# Patient Record
Sex: MALE | Race: WHITE | NOT HISPANIC OR LATINO | Employment: FULL TIME | URBAN - NONMETROPOLITAN AREA
[De-identification: names, ages, dates, MRNs, and addresses within clinical notes are randomized per-mention and may not be internally consistent; named-entity substitution may affect disease eponyms.]

---

## 2023-02-18 ENCOUNTER — APPOINTMENT (EMERGENCY)
Dept: CT IMAGING | Facility: HOSPITAL | Age: 28
End: 2023-02-18

## 2023-02-18 ENCOUNTER — HOSPITAL ENCOUNTER (EMERGENCY)
Facility: HOSPITAL | Age: 28
Discharge: HOME | End: 2023-02-18
Attending: EMERGENCY MEDICINE | Admitting: EMERGENCY MEDICINE

## 2023-02-18 ENCOUNTER — APPOINTMENT (EMERGENCY)
Dept: RADIOLOGY | Facility: HOSPITAL | Age: 28
End: 2023-02-18

## 2023-02-18 VITALS
TEMPERATURE: 97 F | SYSTOLIC BLOOD PRESSURE: 134 MMHG | RESPIRATION RATE: 18 BRPM | OXYGEN SATURATION: 97 % | HEART RATE: 74 BPM | DIASTOLIC BLOOD PRESSURE: 76 MMHG | WEIGHT: 256.39 LBS

## 2023-02-18 DIAGNOSIS — F43.9 STRESS: ICD-10-CM

## 2023-02-18 DIAGNOSIS — R56.9 SEIZURE-LIKE ACTIVITY (HCC): Primary | ICD-10-CM

## 2023-02-18 LAB
ALBUMIN SERPL BCP-MCNC: 4.1 G/DL (ref 3.5–5)
ALP SERPL-CCNC: 55 U/L (ref 34–104)
ALT SERPL W P-5'-P-CCNC: 41 U/L (ref 7–52)
AMPHETAMINES SERPL QL SCN: NEGATIVE
ANION GAP SERPL CALCULATED.3IONS-SCNC: 6 MMOL/L (ref 4–13)
APTT PPP: 27 SECONDS (ref 23–37)
AST SERPL W P-5'-P-CCNC: 25 U/L (ref 13–39)
BARBITURATES UR QL: NEGATIVE
BASOPHILS # BLD AUTO: 0.03 THOUSANDS/ÂΜL (ref 0–0.1)
BASOPHILS NFR BLD AUTO: 1 % (ref 0–1)
BENZODIAZ UR QL: NEGATIVE
BILIRUB SERPL-MCNC: 1.12 MG/DL (ref 0.2–1)
BUN SERPL-MCNC: 13 MG/DL (ref 5–25)
CALCIUM SERPL-MCNC: 9.3 MG/DL (ref 8.4–10.2)
CARDIAC TROPONIN I PNL SERPL HS: 3 NG/L
CHLORIDE SERPL-SCNC: 106 MMOL/L (ref 96–108)
CO2 SERPL-SCNC: 26 MMOL/L (ref 21–32)
COCAINE UR QL: NEGATIVE
CREAT SERPL-MCNC: 0.71 MG/DL (ref 0.6–1.3)
EOSINOPHIL # BLD AUTO: 0.13 THOUSAND/ÂΜL (ref 0–0.61)
EOSINOPHIL NFR BLD AUTO: 2 % (ref 0–6)
ERYTHROCYTE [DISTWIDTH] IN BLOOD BY AUTOMATED COUNT: 11.9 % (ref 11.6–15.1)
ETHANOL SERPL-MCNC: <10 MG/DL
GFR SERPL CREATININE-BSD FRML MDRD: 126 ML/MIN/1.73SQ M
GLUCOSE SERPL-MCNC: 81 MG/DL (ref 65–140)
GLUCOSE SERPL-MCNC: 93 MG/DL (ref 65–140)
HCT VFR BLD AUTO: 45.1 % (ref 36.5–49.3)
HGB BLD-MCNC: 15.9 G/DL (ref 12–17)
IMM GRANULOCYTES # BLD AUTO: 0.01 THOUSAND/UL (ref 0–0.2)
IMM GRANULOCYTES NFR BLD AUTO: 0 % (ref 0–2)
INR PPP: 1.03 (ref 0.84–1.19)
LACTATE SERPL-SCNC: 0.6 MMOL/L (ref 0.5–2)
LYMPHOCYTES # BLD AUTO: 2.04 THOUSANDS/ÂΜL (ref 0.6–4.47)
LYMPHOCYTES NFR BLD AUTO: 35 % (ref 14–44)
MCH RBC QN AUTO: 30.2 PG (ref 26.8–34.3)
MCHC RBC AUTO-ENTMCNC: 35.3 G/DL (ref 31.4–37.4)
MCV RBC AUTO: 86 FL (ref 82–98)
METHADONE UR QL: NEGATIVE
MONOCYTES # BLD AUTO: 0.62 THOUSAND/ÂΜL (ref 0.17–1.22)
MONOCYTES NFR BLD AUTO: 11 % (ref 4–12)
NEUTROPHILS # BLD AUTO: 3.08 THOUSANDS/ÂΜL (ref 1.85–7.62)
NEUTS SEG NFR BLD AUTO: 51 % (ref 43–75)
NRBC BLD AUTO-RTO: 0 /100 WBCS
OPIATES UR QL SCN: NEGATIVE
OXYCODONE+OXYMORPHONE UR QL SCN: NEGATIVE
PCP UR QL: NEGATIVE
PLATELET # BLD AUTO: 200 THOUSANDS/UL (ref 149–390)
PMV BLD AUTO: 9.8 FL (ref 8.9–12.7)
POTASSIUM SERPL-SCNC: 3.8 MMOL/L (ref 3.5–5.3)
PROT SERPL-MCNC: 7.2 G/DL (ref 6.4–8.4)
PROTHROMBIN TIME: 13.6 SECONDS (ref 11.6–14.5)
RBC # BLD AUTO: 5.27 MILLION/UL (ref 3.88–5.62)
SODIUM SERPL-SCNC: 138 MMOL/L (ref 135–147)
THC UR QL: NEGATIVE
WBC # BLD AUTO: 5.91 THOUSAND/UL (ref 4.31–10.16)

## 2023-02-18 NOTE — DISCHARGE INSTRUCTIONS
Please follow up with neurology  A referral has been placed for you  Additionally we would like you to follow-up with psychiatry to help aid you in the future   Please return with new or worsening symptoms  Again, I am so sorry for your loss and will be thinking of you and your family

## 2023-02-18 NOTE — ED PROVIDER NOTES
Emergency Department Trauma Note  Prince Avelar 32 y o  male MRN: [id number]  Unit/Bed#: ED 09/ED 09 Encounter: [id number]      Trauma Alert: Trauma Acuity: Trauma Evaluation  Model of Arrival:   via    Trauma Team: Current Providers  Attending Provider: Lesley Claros MD  Registered Nurse: Keyonna Tubbs RN  Physician Assistant: Rita Messina PA-C  Consultants:     None      History of Present Illness     Chief Complaint:   Chief Complaint   Patient presents with   • Altered Mental Status     Per girlfriend  Pt hit head yesterday at some point because of mental distress  Pt had witnessed seizure yesterday lasting about a minute  Pt has been altered since seizure  Pt somulent but arousable at time of triage     HPI:  Prince Avelar is a 32 y o  male who presents with AMS  Mechanism:Details of Incident: head strike yesterday in afternoon  seizure yesterday at 1130 and has been altered since Injury Date: 02/17/23        64-year-old male presents the emergency department with his meenue for evaluation of altered mental status  Patient is awake and alert  Answering questions appropriately  Somewhat somnolent  Keeps his eyes closed unless specifically asked to open his eyes  Patient's brother passed away Thursday unexpectedly  Patient states yesterday he hit his head off of the wall approximately 2-3 times  He denies any loss of consciousness  Denies nausea or vomiting  Yesterday after a memorial for his brother he was at the bar with fiancé and some family  Patient became very weak and was lowered to the ground by fiancé and family members  He then "slumped over" and began shaking  SO states patient was very confused after this episode  She reports he was still seizing when EMS arrived  States there was no loss of bowel or bladder control  No biting the tongue  States he has blurry vision and feels dizzy  Reports he feels overall weak  Admits to chest tightness  Has history of depression  Denies any thoughts of suicide or homicide  Denies any drug or alcohol use  Arrichard Neil also reports patient had a seizure approximately 2 years ago after he took clindamycin and had anaphylactic shock with subsequently "threw his body into his seizure "  States patient was evaluated after this and was never diagnosed with a seizure disorder  History provided by:  Patient and significant other  Seizure - Prior Hx Of  Seizure activity on arrival: no    Preceding symptoms comment:  Unable to recall  Initial focality:  Unable to specify  Episode characteristics: abnormal movements, confusion, disorientation, generalized shaking, limpness and partial responsiveness    Episode characteristics: no combativeness, no incontinence and no tongue biting    Postictal symptoms: confusion and somnolence    Return to baseline: no    Timing:  Once  Context: emotional upset and stress    Context: not drug use    Recent head injury:  Within the last 24 hours    Review of Systems   Constitutional: Negative  Respiratory: Negative for cough, choking and shortness of breath  Cardiovascular: Positive for chest pain  Negative for palpitations and leg swelling  Gastrointestinal: Negative  Negative for nausea and vomiting  Musculoskeletal: Negative  Skin: Negative for color change and wound  Neurological: Positive for seizures and headaches  Psychiatric/Behavioral:        Stress   All other systems reviewed and are negative  Historical Information     Immunizations: There is no immunization history on file for this patient  History reviewed  No pertinent past medical history  History reviewed  No pertinent family history    Past Surgical History:   Procedure Laterality Date   • FRACTURE SURGERY       Social History     Tobacco Use   • Smoking status: Never   • Smokeless tobacco: Never   Vaping Use   • Vaping Use: Never used     E-Cigarette/Vaping   • E-Cigarette Use Never User      E-Cigarette/Vaping Substances       Family History: non-contributory    Meds/Allergies   None       Allergies   Allergen Reactions   • Amoxicillin Anaphylaxis   • Clindamycin Angioedema     anaphylaxis  anaphylaxis  anaphylaxis     • Penicillins Angioedema and Anaphylaxis       PHYSICAL EXAM    PE limited by: none    Objective   Vitals:   First set: Temperature: (!) 97 °F (36 1 °C) (02/18/23 1055)  Pulse: 70 (02/18/23 1055)  Respirations: 20 (02/18/23 1055)  Blood Pressure: 135/81 (02/18/23 1055)  SpO2: 98 % (02/18/23 1055)    Primary Survey:   (A) Airway: Intact  (B) Breathing: Spontaneously  (C) Circulation: Pulses:   normal  (D) Disabliity:  GCS Total:  15  (E) Expose:  Completed    Secondary Survey: (Click on Physical Exam tab above)  Physical Exam  Vitals and nursing note reviewed  Constitutional:       General: He is not in acute distress  Appearance: He is well-developed and normal weight  He is not ill-appearing, toxic-appearing or diaphoretic  HENT:      Head: Normocephalic and atraumatic  Mouth/Throat:      Mouth: Mucous membranes are moist    Eyes:      Extraocular Movements: Extraocular movements intact  Right eye: No nystagmus  Left eye: No nystagmus  Pupils: Pupils are equal, round, and reactive to light  Neck:      Comments: c-collar on  c spine tenderness to palpation   Cardiovascular:      Rate and Rhythm: Normal rate and regular rhythm  Pulmonary:      Effort: Pulmonary effort is normal  No respiratory distress  Breath sounds: Normal breath sounds  No stridor  No wheezing, rhonchi or rales  Chest:      Chest wall: No tenderness  Abdominal:      General: Bowel sounds are normal       Palpations: Abdomen is soft  Musculoskeletal:         General: Normal range of motion  Cervical back: Normal range of motion  Skin:     General: Skin is warm and dry  Capillary Refill: Capillary refill takes less than 2 seconds  Findings: No erythema     Neurological: General: No focal deficit present  Mental Status: He is alert and oriented to person, place, and time  GCS: GCS eye subscore is 4  GCS verbal subscore is 5  GCS motor subscore is 6  Cranial Nerves: No cranial nerve deficit  Sensory: Sensation is intact  Motor: Motor function is intact  Psychiatric:      Comments: Somnolent         Cervical spine cleared by clinical criteria? No (imaging required)      Invasive Devices     None                 Lab Results:   Results Reviewed     Procedure Component Value Units Date/Time    Rapid drug screen, urine [[id number]]  (Normal) Collected: 02/18/23 1139    Lab Status: Final result Specimen: Urine, Clean Catch Updated: 02/18/23 1205     Amph/Meth UR Negative     Barbiturate Ur Negative     Benzodiazepine Urine Negative     Cocaine Urine Negative     Methadone Urine Negative     Opiate Urine Negative     PCP Ur Negative     THC Urine Negative     Oxycodone Urine Negative    Narrative:      FOR MEDICAL PURPOSES ONLY  IF CONFIRMATION NEEDED PLEASE CONTACT THE LAB WITHIN 5 DAYS      Drug Screen Cutoff Levels:  AMPHETAMINE/METHAMPHETAMINES  1000 ng/mL  BARBITURATES     200 ng/mL  BENZODIAZEPINES     200 ng/mL  COCAINE      300 ng/mL  METHADONE      300 ng/mL  OPIATES      300 ng/mL  PHENCYCLIDINE     25 ng/mL  THC       50 ng/mL  OXYCODONE      100 ng/mL    Ethanol [[id number]]  (Normal) Collected: 02/18/23 1112    Lab Status: Final result Specimen: Blood from Arm, Left Updated: 02/18/23 1155     Ethanol Lvl <10 mg/dL     HS Troponin 0hr (reflex protocol) [[id number]]  (Normal) Collected: 02/18/23 1112    Lab Status: Final result Specimen: Blood from Arm, Left Updated: 02/18/23 1147     hs TnI 0hr 3 ng/L     Comprehensive metabolic panel [[id number]]  (Abnormal) Collected: 02/18/23 1112    Lab Status: Final result Specimen: Blood from Arm, Left Updated: 02/18/23 1139     Sodium 138 mmol/L      Potassium 3 8 mmol/L      Chloride 106 mmol/L      CO2 26 mmol/L ANION GAP 6 mmol/L      BUN 13 mg/dL      Creatinine 0 71 mg/dL      Glucose 93 mg/dL      Calcium 9 3 mg/dL      AST 25 U/L      ALT 41 U/L      Alkaline Phosphatase 55 U/L      Total Protein 7 2 g/dL      Albumin 4 1 g/dL      Total Bilirubin 1 12 mg/dL      eGFR 126 ml/min/1 73sq m     Narrative:      [place] guidelines for Chronic Kidney Disease (CKD):   •  Stage 1 with normal or high GFR (GFR > 90 mL/min/1 73 square meters)  •  Stage 2 Mild CKD (GFR = 60-89 mL/min/1 73 square meters)  •  Stage 3A Moderate CKD (GFR = 45-59 mL/min/1 73 square meters)  •  Stage 3B Moderate CKD (GFR = 30-44 mL/min/1 73 square meters)  •  Stage 4 Severe CKD (GFR = 15-29 mL/min/1 73 square meters)  •  Stage 5 End Stage CKD (GFR <15 mL/min/1 73 square meters)  Note: GFR calculation is accurate only with a steady state creatinine    Lactic acid [[id number]]  (Normal) Collected: 02/18/23 1112    Lab Status: Final result Specimen: Blood from Arm, Left Updated: 02/18/23 1138     LACTIC ACID 0 6 mmol/L     Narrative:      Result may be elevated if tourniquet was used during collection      Protime-INR [[id number]]  (Normal) Collected: 02/18/23 1112    Lab Status: Final result Specimen: Blood from Arm, Left Updated: 02/18/23 1134     Protime 13 6 seconds      INR 1 03    APTT [[id number]]  (Normal) Collected: 02/18/23 1112    Lab Status: Final result Specimen: Blood from Arm, Left Updated: 02/18/23 1134     PTT 27 seconds     CBC and differential [[id number]] Collected: 02/18/23 1112    Lab Status: Final result Specimen: Blood from Arm, Left Updated: 02/18/23 1121     WBC 5 91 Thousand/uL      RBC 5 27 Million/uL      Hemoglobin 15 9 g/dL      Hematocrit 45 1 %      MCV 86 fL      MCH 30 2 pg      MCHC 35 3 g/dL      RDW 11 9 %      MPV 9 8 fL      Platelets 583 Thousands/uL      nRBC 0 /100 WBCs      Neutrophils Relative 51 %      Immat GRANS % 0 %      Lymphocytes Relative 35 %      Monocytes Relative 11 % Eosinophils Relative 2 %      Basophils Relative 1 %      Neutrophils Absolute 3 08 Thousands/µL      Immature Grans Absolute 0 01 Thousand/uL      Lymphocytes Absolute 2 04 Thousands/µL      Monocytes Absolute 0 62 Thousand/µL      Eosinophils Absolute 0 13 Thousand/µL      Basophils Absolute 0 03 Thousands/µL     Fingerstick Glucose (POCT) [[id number]]  (Normal) Collected: 02/18/23 1054    Lab Status: Final result Updated: 02/18/23 1055     POC Glucose 81 mg/dl                  Imaging Studies:   Direct to CT: No  TRAUMA - CT head wo contrast   Final Result by Briana Quiñones MD (02/18 1137)      No acute intracranial abnormality  Workstation performed: [id number]         TRAUMA - CT spine cervical wo contrast   Final Result by Briana Quiñones MD (02/18 1135)      No acute cervical spine fracture or traumatic malalignment  Workstation performed: [id number]         XR Trauma chest portable   Final Result by Fransico Rubio MD (02/18 1717)      No acute cardiopulmonary disease  Workstation performed: [id number]         XR Trauma pelvis ap only 1 or 2 vw   Final Result by Fransico Rbuio MD (02/18 1718)      No acute osseous abnormality        Workstation performed: [id number]               Procedures  ECG 12 Lead Documentation Only    Date/Time: 2/18/2023 5:32 PM  Performed by: Priti Posada PA-C  Authorized by: Priti Posada PA-C     Patient location:  ED  Interpretation:     Interpretation: non-specific    Rate:     ECG rate:  67    ECG rate assessment: normal    Rhythm:     Rhythm: sinus rhythm    Ectopy:     Ectopy: none    QRS:     QRS axis:  Normal    QRS intervals:  Normal  Conduction:     Conduction: normal    ST segments:     ST segments:  Normal  T waves:     T waves: normal               ED Course  ED Course as of 02/18/23 1736   Sat Feb 18, 2023   1108 POC Glucose: 81   1124 Trauma evaluation was called on arrival prior to evaluation  Chest x-ray obtained ED interpretation negative for acute cardiopulmonary findings  Pelvic x-ray negative for osseous injury   1138 Patient reassessed  Continues to be awake, alert and oriented  1143 CT head: No acute intracranial abnormality  0 CT Cspine: No acute cervical spine fracture or traumatic malalignment  1147 Cervical Collar Clearance: The patient had a CT scan of the cervical spine demonstrating no acute injury  On exam, the patient had no midline point tenderness or paresthesias/numbness/weakness in the extremities  The patient had full range of motion (was then able to flex, extend, and rotate head laterally) without pain  There were no distracting injuries and the patient was not intoxicated  The patient's cervical spine was cleared radiologically and clinically  Cervical collar removed at this time  Jerrod Garcia PA-C  2/18/2023 11:47 AM        1209 MEDICAL ALCOHOL: <10   1209 Drug screen negative   1209 LACTIC ACID: 0 6   1209 hs TnI 0hr: 3   1209 TT sent to Epileptologist   4684 Dr Margareth Magana "Certainly sounds like he has reason for it to be stress induced/nonepileptic  If it was just one event and if he comes back to normal self, he likely could get a routine EEG outpatient and follow up outpatient  If he has more events, then could consider more urgent testing like continuous EEG  Again if it was just the one recent event (prior 2 years ago), it's probably unlikely we'd catch another event on continuous videoEEG"   1242 Patient is back to baseline health  He is awake, alert, oriented  Significant other feels patient is back to his baseline  We had a long discussion of appropriate follow-up patient will see neurology for outpatient EEG  Additionally will have patient follow-up with psychiatry due to recent stressors  He is agreeable to this treatment plan he is clinically and hemodynamically stable for discharge     1304 Patient ambulated out of the department without difficulty           Medical Decision Making  68-year-old male presented to the emergency department for evaluation of mental status  Patient hit his head yesterday  Had seizure-like activity  Has not been in same self since  Trauma evaluation was called on arrival   Was awake, alert, oriented  Somewhat somnolent  This improved  He returned to baseline  CT head and C-spine negative for acute traumatic injury  EKG was nonischemic  Troponin negative  Laboratory findings unremarkable  I discussed with Epileptologist who felt patient was stable for discharge and recommended outpatient work-up  Patient and fiancé were agreeable this treatment plan  Patient was clinically and hemodynamically stable for discharge    Seizure-like activity [facility]): acute illness or injury  Stress: acute illness or injury  Amount and/or Complexity of Data Reviewed  Independent Historian:      Details: Significant other  External Data Reviewed: notes  Labs: ordered  Decision-making details documented in ED Course  Radiology: ordered  ECG/medicine tests: ordered  Discussion of management or test interpretation with external provider(s): Epileptologist                Disposition  [facility] Transfer: No  Final diagnoses:   Seizure-like activity ([address] )   Stress     Time reflects when diagnosis was documented in both MDM as applicable and the Disposition within this note     Time User Action Codes Description Comment    2/18/2023 12:47 PM Bernett Patches [R56 9] Seizure-like activity ([address] )     2/18/2023 12:49 PM Gretel Bustillo Add [F43 9] Stress       ED Disposition     ED Disposition   Discharge    Condition   Stable    Date/Time   Sat Feb 18, 2023 12:46 PM    [address] discharge to home/self care                 Follow-up Information     Follow up With Specialties Details Why Lawson Rendon MD Family Medicine   [place] [id number] [place] [id number]  [phone] Salbador Ware MD Neurology   [address] [place] [address]  [phone]          There are no discharge medications for this patient          PDMP Review     None          ED Provider  Electronically Signed by         Jesus Jimenez PA-C  02/18/23 3628

## 2023-02-18 NOTE — ED ATTENDING ATTESTATION
2/18/2023  IStevan MD, saw and evaluated the patient  I have discussed the patient with the resident/non-physician practitioner and agree with the resident's/non-physician practitioner's findings, Plan of Care, and MDM as documented in the resident's/non-physician practitioner's note, except where noted  All available labs and Radiology studies were reviewed  I was present for key portions of any procedure(s) performed by the resident/non-physician practitioner and I was immediately available to provide assistance  At this point I agree with the current assessment done in the Emergency Department  I have conducted an independent evaluation of this patient a history and physical is as follows:    27M w altered mental status  Was [facility] at [facility] ED yesterday for fall and seizure activity  Had labwork done but no imaging  Presents today as jacksone feels patient not back to normal      On exam, patient's eyes are closed but he opens them to command and is answering questions  Trauma alerted given altered mental status + recent fall  CT head and C-spine negative  Labwork reassuring  Patient returned back to his baseline  PA discussed case with epileptologist  Kristen Kahn outpatient follow-up with neurology

## 2023-02-19 LAB
ATRIAL RATE: 67 BPM
P AXIS: 71 DEGREES
PR INTERVAL: 166 MS
QRS AXIS: 67 DEGREES
QRSD INTERVAL: 110 MS
QT INTERVAL: 372 MS
QTC INTERVAL: 393 MS
T WAVE AXIS: 47 DEGREES
VENTRICULAR RATE: 67 BPM

## 2023-03-10 ENCOUNTER — TELEPHONE (OUTPATIENT)
Dept: PSYCHIATRY | Facility: CLINIC | Age: 28
End: 2023-03-10

## 2023-03-31 ENCOUNTER — TELEPHONE (OUTPATIENT)
Dept: PSYCHIATRY | Facility: CLINIC | Age: 28
End: 2023-03-31

## 2025-02-15 ENCOUNTER — APPOINTMENT (EMERGENCY)
Dept: RADIOLOGY | Facility: HOSPITAL | Age: 30
End: 2025-02-15
Payer: COMMERCIAL

## 2025-02-15 ENCOUNTER — HOSPITAL ENCOUNTER (EMERGENCY)
Facility: HOSPITAL | Age: 30
Discharge: HOME | End: 2025-02-15
Attending: EMERGENCY MEDICINE | Admitting: EMERGENCY MEDICINE
Payer: COMMERCIAL

## 2025-02-15 VITALS
BODY MASS INDEX: 33.85 KG/M2 | RESPIRATION RATE: 18 BRPM | TEMPERATURE: 97.4 F | HEART RATE: 71 BPM | OXYGEN SATURATION: 98 % | SYSTOLIC BLOOD PRESSURE: 122 MMHG | WEIGHT: 272.27 LBS | DIASTOLIC BLOOD PRESSURE: 79 MMHG | HEIGHT: 75 IN

## 2025-02-15 DIAGNOSIS — R56.9 SEIZURE-LIKE ACTIVITY (HCC): Primary | ICD-10-CM

## 2025-02-15 DIAGNOSIS — F43.9 STRESS: ICD-10-CM

## 2025-02-15 LAB
ANION GAP SERPL CALCULATED.3IONS-SCNC: 6 MMOL/L (ref 4–13)
BASOPHILS # BLD AUTO: 0.03 THOUSANDS/ΜL (ref 0–0.1)
BASOPHILS NFR BLD AUTO: 1 % (ref 0–1)
BUN SERPL-MCNC: 11 MG/DL (ref 5–25)
CALCIUM SERPL-MCNC: 9.3 MG/DL (ref 8.4–10.2)
CARDIAC TROPONIN I PNL SERPL HS: 5 NG/L (ref ?–50)
CHLORIDE SERPL-SCNC: 104 MMOL/L (ref 96–108)
CO2 SERPL-SCNC: 28 MMOL/L (ref 21–32)
CREAT SERPL-MCNC: 0.89 MG/DL (ref 0.6–1.3)
EOSINOPHIL # BLD AUTO: 0.14 THOUSAND/ΜL (ref 0–0.61)
EOSINOPHIL NFR BLD AUTO: 2 % (ref 0–6)
ERYTHROCYTE [DISTWIDTH] IN BLOOD BY AUTOMATED COUNT: 11.9 % (ref 11.6–15.1)
GFR SERPL CREATININE-BSD FRML MDRD: 113 ML/MIN/1.73SQ M
GLUCOSE SERPL-MCNC: 127 MG/DL (ref 65–140)
GLUCOSE SERPL-MCNC: 141 MG/DL (ref 65–140)
HCT VFR BLD AUTO: 43.4 % (ref 36.5–49.3)
HGB BLD-MCNC: 15.5 G/DL (ref 12–17)
IMM GRANULOCYTES # BLD AUTO: 0.02 THOUSAND/UL (ref 0–0.2)
IMM GRANULOCYTES NFR BLD AUTO: 0 % (ref 0–2)
LACTATE SERPL-SCNC: 1.4 MMOL/L (ref 0.5–2)
LYMPHOCYTES # BLD AUTO: 2.73 THOUSANDS/ΜL (ref 0.6–4.47)
LYMPHOCYTES NFR BLD AUTO: 45 % (ref 14–44)
MCH RBC QN AUTO: 30.4 PG (ref 26.8–34.3)
MCHC RBC AUTO-ENTMCNC: 35.7 G/DL (ref 31.4–37.4)
MCV RBC AUTO: 85 FL (ref 82–98)
MONOCYTES # BLD AUTO: 0.5 THOUSAND/ΜL (ref 0.17–1.22)
MONOCYTES NFR BLD AUTO: 8 % (ref 4–12)
NEUTROPHILS # BLD AUTO: 2.72 THOUSANDS/ΜL (ref 1.85–7.62)
NEUTS SEG NFR BLD AUTO: 44 % (ref 43–75)
NRBC BLD AUTO-RTO: 0 /100 WBCS
PLATELET # BLD AUTO: 204 THOUSANDS/UL (ref 149–390)
PMV BLD AUTO: 10.1 FL (ref 8.9–12.7)
POTASSIUM SERPL-SCNC: 3.8 MMOL/L (ref 3.5–5.3)
RBC # BLD AUTO: 5.1 MILLION/UL (ref 3.88–5.62)
SODIUM SERPL-SCNC: 138 MMOL/L (ref 135–147)
WBC # BLD AUTO: 6.14 THOUSAND/UL (ref 4.31–10.16)

## 2025-02-15 PROCEDURE — 82948 REAGENT STRIP/BLOOD GLUCOSE: CPT

## 2025-02-15 PROCEDURE — 83605 ASSAY OF LACTIC ACID: CPT | Performed by: PHYSICIAN ASSISTANT

## 2025-02-15 PROCEDURE — 93005 ELECTROCARDIOGRAM TRACING: CPT

## 2025-02-15 PROCEDURE — 85025 COMPLETE CBC W/AUTO DIFF WBC: CPT | Performed by: PHYSICIAN ASSISTANT

## 2025-02-15 PROCEDURE — 99285 EMERGENCY DEPT VISIT HI MDM: CPT | Performed by: PHYSICIAN ASSISTANT

## 2025-02-15 PROCEDURE — 80048 BASIC METABOLIC PNL TOTAL CA: CPT | Performed by: PHYSICIAN ASSISTANT

## 2025-02-15 PROCEDURE — 71045 X-RAY EXAM CHEST 1 VIEW: CPT

## 2025-02-15 PROCEDURE — 84484 ASSAY OF TROPONIN QUANT: CPT | Performed by: PHYSICIAN ASSISTANT

## 2025-02-15 PROCEDURE — 36415 COLL VENOUS BLD VENIPUNCTURE: CPT | Performed by: PHYSICIAN ASSISTANT

## 2025-02-15 PROCEDURE — 99284 EMERGENCY DEPT VISIT MOD MDM: CPT

## 2025-02-16 NOTE — DISCHARGE INSTRUCTIONS
Please follow-up with your neurologist.  Return to the emergency department for any new or worsening symptoms

## 2025-02-16 NOTE — ED PROVIDER NOTES
Time reflects when diagnosis was documented in both MDM as applicable and the Disposition within this note       Time User Action Codes Description Comment    2/15/2025  9:21 PM Robbieizabella Martina Add [R56.9] Seizure-like activity (HCC)     2/15/2025  9:30 PM Martina Parham Add [F43.9] Stress           ED Disposition       ED Disposition   Discharge    Condition   Stable    Date/Time   Sat Feb 15, 2025  9:21 PM    Comment   Benedicto Parra discharge to home/self care.                   Assessment & Plan       Medical Decision Making  29-year-old male presented to the emergency department for evaluation status post seizure-like activity.  Vitals and medical record reviewed.  Patient at risk for the following but not limited to seizure, pseudoseizure, hypoglycemia, electrolyte abnormality.  Laboratory findings unremarkable.  Patient was feeling improved.  Did discuss follow-up and patient requested a therapy referral.  Referral to psychiatry was placed.  We discussed symptomatic care at home return precautions and patient and spouse verbalized understanding.  Patient was clinically and hemodynamically stable for discharge    Problems Addressed:  Seizure-like activity (HCC): acute illness or injury  Stress: acute illness or injury    Amount and/or Complexity of Data Reviewed  Labs: ordered. Decision-making details documented in ED Course.  Radiology: ordered.        ED Course as of 02/15/25 2136   Sat Feb 15, 2025   2119 WBC: 6.14   2119 LACTIC ACID: 1.4   2126 I discussed all results and findings with patient and fiancé.       Medications - No data to display    ED Risk Strat Scores                            SBIRT 20yo+      Flowsheet Row Most Recent Value   Initial Alcohol Screen: US AUDIT-C     1. How often do you have a drink containing alcohol? 0 Filed at: 02/15/2025 2036   2. How many drinks containing alcohol do you have on a typical day you are drinking?  0 Filed at: 02/15/2025 2036   3a. Male UNDER 65: How  "often do you have five or more drinks on one occasion? 0 Filed at: 02/15/2025 2036   3b. FEMALE Any Age, or MALE 65+: How often do you have 4 or more drinks on one occassion? 0 Filed at: 02/15/2025 2036   Audit-C Score 0 Filed at: 02/15/2025 2036   AMANDA: How many times in the past year have you...    Used an illegal drug or used a prescription medication for non-medical reasons? Never Filed at: 02/15/2025 2036                            History of Present Illness       Chief Complaint   Patient presents with    Seizure - Prior Hx Of     EMS reports patient has \" stress induced seizures\". Family reports seizure lasted 4- 5 mins. Pt post ictal upon arrival        No past medical history on file.   Past Surgical History:   Procedure Laterality Date    FRACTURE SURGERY        No family history on file.   Social History     Tobacco Use    Smoking status: Never    Smokeless tobacco: Never   Vaping Use    Vaping status: Never Used   Substance Use Topics    Alcohol use: Never    Drug use: Never      E-Cigarette/Vaping    E-Cigarette Use Never User       E-Cigarette/Vaping Substances      I have reviewed and agree with the history as documented.     29-year-old male presents to the emergency department for evaluation status post seizure.  Patient states while at home he was looking at old pictures of him and his brother who will have passed away 2 years ago tomorrow.  Dates he became upset and could feel a seizure coming on.  States \"I was too far gone\" and he was not able to stop the seizure.  Reports he typically is able to feel seizures coming on and can typically stop them and states his fiancée is also good at stopping them however tonight he was unable to stop it.  Patient states he was previously following with neurology however is no longer.  States at one point he was scribed clindamycin by a dentist and experienced anaphylactic reaction and states since then he refuses to take any prescribed medication.  Reports at " 1 point his neurologist recommended a medication which he refused because of this.  Patient states after testing he was told that his seizures are due to stress.  Patient denies any drug or alcohol use.  He had no loss of bowel or bladder control.  No tongue biting.  Patient states he now has some chest pain.  He denies any shortness of breath or palpitations.  Denies any headaches or visual changes.  Denies confusion.        Review of Systems   Constitutional: Negative.    Respiratory: Negative.     Cardiovascular:  Positive for chest pain.   Gastrointestinal: Negative.    Musculoskeletal: Negative.    Skin: Negative.    Neurological:  Positive for seizures.   All other systems reviewed and are negative.          Objective       ED Triage Vitals [02/15/25 2036]   Temperature Pulse Blood Pressure Respirations SpO2 Patient Position - Orthostatic VS   (!) 97.4 °F (36.3 °C) 81 130/76 16 98 % Sitting      Temp Source Heart Rate Source BP Location FiO2 (%) Pain Score    Temporal Monitor Left arm -- 6      Vitals      Date and Time Temp Pulse SpO2 Resp BP Pain Score FACES Pain Rating User   02/15/25 2132 -- 71 98 % 18 122/79 -- -- PK   02/15/25 2048 -- -- -- -- -- 5 -- PK   02/15/25 2036 97.4 °F (36.3 °C) 81 98 % 16 130/76 6 -- AD            Physical Exam  Vitals and nursing note reviewed.   Constitutional:       General: He is not in acute distress.     Appearance: Normal appearance. He is not ill-appearing, toxic-appearing or diaphoretic.   HENT:      Head: Normocephalic and atraumatic.      Comments: No bite marks on the tongue     Nose: Nose normal.   Eyes:      Extraocular Movements: Extraocular movements intact.      Conjunctiva/sclera: Conjunctivae normal.      Pupils: Pupils are equal, round, and reactive to light.   Cardiovascular:      Rate and Rhythm: Normal rate and regular rhythm.   Pulmonary:      Effort: Pulmonary effort is normal.      Breath sounds: Normal breath sounds. No stridor. No wheezing, rhonchi  or rales.   Chest:      Chest wall: No tenderness.   Musculoskeletal:         General: Normal range of motion.      Cervical back: Normal range of motion.   Skin:     General: Skin is warm and dry.   Neurological:      General: No focal deficit present.      Mental Status: He is alert and oriented to person, place, and time.      Motor: No weakness.      Coordination: Coordination normal.         Results Reviewed       Procedure Component Value Units Date/Time    HS Troponin I 2hr [[id number]]     Lab Status: No result Specimen: Blood     Basic metabolic panel [[id number]] Collected: 02/15/25 2051    Lab Status: Final result Specimen: Blood from Arm, Left Updated: 02/15/25 2120     Sodium 138 mmol/L      Potassium 3.8 mmol/L      Chloride 104 mmol/L      CO2 28 mmol/L      ANION GAP 6 mmol/L      BUN 11 mg/dL      Creatinine 0.89 mg/dL      Glucose 127 mg/dL      Calcium 9.3 mg/dL      eGFR 113 ml/min/1.73sq m     Narrative:      [employer] guidelines for Chronic Kidney Disease (CKD):     Stage 1 with normal or high GFR (GFR > 90 mL/min/1.73 square meters)    Stage 2 Mild CKD (GFR = 60-89 mL/min/1.73 square meters)    Stage 3A Moderate CKD (GFR = 45-59 mL/min/1.73 square meters)    Stage 3B Moderate CKD (GFR = 30-44 mL/min/1.73 square meters)    Stage 4 Severe CKD (GFR = 15-29 mL/min/1.73 square meters)    Stage 5 End Stage CKD (GFR <15 mL/min/1.73 square meters)  Note: GFR calculation is accurate only with a steady state creatinine    HS Troponin 0hr (reflex protocol) [[id number]]  (Normal) Collected: 02/15/25 2051    Lab Status: Final result Specimen: Blood from Arm, Left Updated: 02/15/25 2120     hs TnI 0hr 5 ng/L     Lactic acid, plasma (w/reflex if result > 2.0) [[id number]]  (Normal) Collected: 02/15/25 2051    Lab Status: Final result Specimen: Blood from Arm, Left Updated: 02/15/25 2115     LACTIC ACID 1.4 mmol/L     Narrative:      Result may be elevated if tourniquet was used during  collection.    CBC and differential [[id number]]  (Abnormal) Collected: 02/15/25 2051    Lab Status: Final result Specimen: Blood from Arm, Left Updated: 02/15/25 2056     WBC 6.14 Thousand/uL      RBC 5.10 Million/uL      Hemoglobin 15.5 g/dL      Hematocrit 43.4 %      MCV 85 fL      MCH 30.4 pg      MCHC 35.7 g/dL      RDW 11.9 %      MPV 10.1 fL      Platelets 204 Thousands/uL      nRBC 0 /100 WBCs      Segmented % 44 %      Immature Grans % 0 %      Lymphocytes % 45 %      Monocytes % 8 %      Eosinophils Relative 2 %      Basophils Relative 1 %      Absolute Neutrophils 2.72 Thousands/µL      Absolute Immature Grans 0.02 Thousand/uL      Absolute Lymphocytes 2.73 Thousands/µL      Absolute Monocytes 0.50 Thousand/µL      Eosinophils Absolute 0.14 Thousand/µL      Basophils Absolute 0.03 Thousands/µL     Fingerstick Glucose (POCT) [[id number]]  (Abnormal) Collected: 02/15/25 2037    Lab Status: Final result Specimen: Blood Updated: 02/15/25 2038     POC Glucose 141 mg/dl             XR chest 1 view portable    (Results Pending)       ECG 12 Lead Documentation Only    Date/Time: 2/15/2025 8:39 PM    Performed by: Martina Parham PA-C  Authorized by: Martina Parham PA-C    Patient location:  ED  Interpretation:     Interpretation: non-specific    Rate:     ECG rate:  78    ECG rate assessment: normal    Rhythm:     Rhythm: sinus rhythm    Ectopy:     Ectopy: none    QRS:     QRS axis:  Normal    QRS intervals:  Normal  Conduction:     Conduction: normal        ED Medication and Procedure Management   None     Patient's Medications    No medications on file       ED SEPSIS DOCUMENTATION   Time reflects when diagnosis was documented in both MDM as applicable and the Disposition within this note       Time User Action Codes Description Comment    2/15/2025  9:21 PM Martina Parham [R56.9] Seizure-like activity (HCC)     2/15/2025  9:30 PM Martina Parham [F43.9] Stress                  Martina Parham  REY  02/15/25 2131

## 2025-02-16 NOTE — ED ATTENDING ATTESTATION
2/15/2025  INeetu, DO,  I have discussed the patient with the resident/non-physician practitioner and agree with the resident's/non-physician practitioner's findings, Plan of Care, and MDM as documented in the resident's/non-physician practitioner's note, except where noted. All available labs and Radiology studies were reviewed.  I was present for key portions of any procedure(s) performed by the resident/non-physician practitioner and I was immediately available to provide assistance.       At this point I agree with the current assessment done in the Emergency Department.    Critical Care Time  Procedures

## 2025-02-17 ENCOUNTER — TELEPHONE (OUTPATIENT)
Age: 30
End: 2025-02-17

## 2025-02-17 LAB
ATRIAL RATE: 78 BPM
P AXIS: 47 DEGREES
PR INTERVAL: 162 MS
QRS AXIS: 50 DEGREES
QRSD INTERVAL: 100 MS
QT INTERVAL: 354 MS
QTC INTERVAL: 403 MS
T WAVE AXIS: 29 DEGREES
VENTRICULAR RATE: 78 BPM

## 2025-02-17 NOTE — TELEPHONE ENCOUNTER
Contacted Pt. in regards to ROUTINE Referral, LVM to contact [phone] to discuss services needed at this time in order to be added to proper wait list.

## 2025-02-24 NOTE — TELEPHONE ENCOUNTER
Contacted Pt. in regards to ROUTINE Referral, LVM to contact [phone] to discuss services needed at this time in order to be added to proper wait list.    Second attempt.

## 2025-02-24 NOTE — TELEPHONE ENCOUNTER
Received call from patient regarding Routine Referral in attempts to verify patient's needs of services. Writer verified Full Name, , and Callback Number. Writer spoke with patient and when Writer was reviewing the referral, phone was disconnected by patient.    closed, Pt refused services

## 2025-06-08 ENCOUNTER — ANESTHESIA (OUTPATIENT)
Dept: ANESTHESIOLOGY | Facility: HOSPITAL | Age: 30
End: 2025-06-08

## 2025-06-08 ENCOUNTER — ANESTHESIA EVENT (OUTPATIENT)
Dept: ANESTHESIOLOGY | Facility: HOSPITAL | Age: 30
End: 2025-06-08

## 2025-06-08 NOTE — ANESTHESIA PREPROCEDURE EVALUATION
Procedure:  PRE-OP ONLY    Relevant Problems   No relevant active problems        Physical Exam    Airway     Mallampati score: II  TM Distance: >3 FB  Neck ROM: full  Mouth opening: >= 4 cm      Cardiovascular  Cardiovascular exam normal    Dental   No notable dental hx     Pulmonary  Pulmonary exam normal     Neurological  - normal exam    Other Findings        Anesthesia Plan  ASA Score- 1     Anesthesia Type- IV sedation with anesthesia with ASA Monitors.         Additional Monitors:     Airway Plan: natural airway.           Plan Factors-Exercise tolerance (METS): >4 METS.    Chart reviewed.  Imaging results reviewed. Existing labs reviewed. Patient summary reviewed.    Patient is not a current smoker.      Obstructive sleep apnea risk education given perioperatively.        Induction- intravenous.    Postoperative Plan- .   Monitoring Plan - Monitoring plan - standard ASA monitoring      Perioperative Resuscitation Plan - Level 1 - Full Code.       Informed Consent-       NPO Status:  No vitals data found for the desired time range.